# Patient Record
Sex: FEMALE | Race: WHITE
[De-identification: names, ages, dates, MRNs, and addresses within clinical notes are randomized per-mention and may not be internally consistent; named-entity substitution may affect disease eponyms.]

---

## 2018-04-04 ENCOUNTER — HOSPITAL ENCOUNTER (OUTPATIENT)
Dept: HOSPITAL 17 - CPRE | Age: 58
End: 2018-04-04
Attending: NEUROLOGICAL SURGERY
Payer: COMMERCIAL

## 2018-04-04 DIAGNOSIS — C71.9: Primary | ICD-10-CM

## 2018-04-10 ENCOUNTER — HOSPITAL ENCOUNTER (INPATIENT)
Dept: HOSPITAL 17 - HSDI | Age: 58
LOS: 3 days | Discharge: HOME HEALTH | DRG: 27 | End: 2018-04-13
Attending: NEUROLOGICAL SURGERY | Admitting: NEUROLOGICAL SURGERY
Payer: COMMERCIAL

## 2018-04-10 VITALS — HEART RATE: 70 BPM

## 2018-04-10 VITALS — WEIGHT: 153.88 LBS | HEIGHT: 68 IN | BODY MASS INDEX: 23.32 KG/M2

## 2018-04-10 VITALS
TEMPERATURE: 98.1 F | DIASTOLIC BLOOD PRESSURE: 63 MMHG | RESPIRATION RATE: 21 BRPM | HEART RATE: 52 BPM | OXYGEN SATURATION: 100 % | SYSTOLIC BLOOD PRESSURE: 133 MMHG

## 2018-04-10 VITALS
SYSTOLIC BLOOD PRESSURE: 119 MMHG | TEMPERATURE: 98 F | DIASTOLIC BLOOD PRESSURE: 57 MMHG | RESPIRATION RATE: 22 BRPM | OXYGEN SATURATION: 100 % | HEART RATE: 66 BPM

## 2018-04-10 DIAGNOSIS — C71.1: Primary | ICD-10-CM

## 2018-04-10 LAB
BASOPHILS # BLD AUTO: 0 TH/MM3 (ref 0–0.2)
BASOPHILS NFR BLD: 0.1 % (ref 0–2)
BUN SERPL-MCNC: 11 MG/DL (ref 7–18)
CALCIUM SERPL-MCNC: 8 MG/DL (ref 8.5–10.1)
CALCIUM TP COR SERPL-MCNC: 9 MG/DL (ref 8.5–10.1)
CHLORIDE SERPL-SCNC: 111 MEQ/L (ref 98–107)
CREAT SERPL-MCNC: 0.63 MG/DL (ref 0.5–1)
EOSINOPHIL # BLD: 0 TH/MM3 (ref 0–0.4)
EOSINOPHIL NFR BLD: 0.1 % (ref 0–4)
ERYTHROCYTE [DISTWIDTH] IN BLOOD BY AUTOMATED COUNT: 12.8 % (ref 11.6–17.2)
GFR SERPLBLD BASED ON 1.73 SQ M-ARVRAT: 97 ML/MIN (ref 89–?)
GLUCOSE SERPL-MCNC: 137 MG/DL (ref 74–106)
HCO3 BLD-SCNC: 25.8 MEQ/L (ref 21–32)
HCT VFR BLD CALC: 30.6 % (ref 35–46)
HGB BLD-MCNC: 10.5 GM/DL (ref 11.6–15.3)
LYMPHOCYTES # BLD AUTO: 0.6 TH/MM3 (ref 1–4.8)
LYMPHOCYTES NFR BLD AUTO: 5.9 % (ref 9–44)
MAGNESIUM SERPL-MCNC: 1.8 MG/DL (ref 1.5–2.5)
MCH RBC QN AUTO: 29.5 PG (ref 27–34)
MCHC RBC AUTO-ENTMCNC: 34.5 % (ref 32–36)
MCV RBC AUTO: 85.7 FL (ref 80–100)
MONOCYTE #: 0.2 TH/MM3 (ref 0–0.9)
MONOCYTES NFR BLD: 2.1 % (ref 0–8)
NEUTROPHILS # BLD AUTO: 8.7 TH/MM3 (ref 1.8–7.7)
NEUTROPHILS NFR BLD AUTO: 91.8 % (ref 16–70)
PLATELET # BLD: 241 TH/MM3 (ref 150–450)
PMV BLD AUTO: 8.4 FL (ref 7–11)
PROT SERPL-MCNC: 5.4 GM/DL (ref 6.4–8.2)
RBC # BLD AUTO: 3.57 MIL/MM3 (ref 4–5.3)
SODIUM SERPL-SCNC: 144 MEQ/L (ref 136–145)
WBC # BLD AUTO: 9.5 TH/MM3 (ref 4–11)

## 2018-04-10 PROCEDURE — 88333 PATH CONSLTJ SURG CYTO XM 1: CPT

## 2018-04-10 PROCEDURE — 80048 BASIC METABOLIC PNL TOTAL CA: CPT

## 2018-04-10 PROCEDURE — 86901 BLOOD TYPING SEROLOGIC RH(D): CPT

## 2018-04-10 PROCEDURE — 86850 RBC ANTIBODY SCREEN: CPT

## 2018-04-10 PROCEDURE — 86900 BLOOD TYPING SEROLOGIC ABO: CPT

## 2018-04-10 PROCEDURE — 85025 COMPLETE CBC W/AUTO DIFF WBC: CPT

## 2018-04-10 PROCEDURE — 84155 ASSAY OF PROTEIN SERUM: CPT

## 2018-04-10 PROCEDURE — C1713 ANCHOR/SCREW BN/BN,TIS/BN: HCPCS

## 2018-04-10 PROCEDURE — 70450 CT HEAD/BRAIN W/O DYE: CPT

## 2018-04-10 PROCEDURE — 94150 VITAL CAPACITY TEST: CPT

## 2018-04-10 PROCEDURE — 88307 TISSUE EXAM BY PATHOLOGIST: CPT

## 2018-04-10 PROCEDURE — 00B00ZX EXCISION OF BRAIN, OPEN APPROACH, DIAGNOSTIC: ICD-10-PCS | Performed by: NEUROLOGICAL SURGERY

## 2018-04-10 PROCEDURE — 83735 ASSAY OF MAGNESIUM: CPT

## 2018-04-10 RX ADMIN — DEXAMETHASONE SODIUM PHOSPHATE SCH MG: 4 INJECTION, SOLUTION INTRAMUSCULAR; INTRAVENOUS at 16:48

## 2018-04-10 RX ADMIN — STANDARDIZED SENNA CONCENTRATE AND DOCUSATE SODIUM SCH TAB: 8.6; 5 TABLET, FILM COATED ORAL at 22:25

## 2018-04-10 RX ADMIN — MORPHINE SULFATE PRN MG: 2 INJECTION, SOLUTION INTRAMUSCULAR; INTRAVENOUS at 22:37

## 2018-04-10 RX ADMIN — HYDROCODONE BITARTRATE AND ACETAMINOPHEN PRN TAB: 10; 325 TABLET ORAL at 22:32

## 2018-04-10 RX ADMIN — DEXAMETHASONE SODIUM PHOSPHATE SCH MG: 4 INJECTION, SOLUTION INTRAMUSCULAR; INTRAVENOUS at 22:51

## 2018-04-10 RX ADMIN — MORPHINE SULFATE PRN MG: 2 INJECTION, SOLUTION INTRAMUSCULAR; INTRAVENOUS at 18:27

## 2018-04-10 RX ADMIN — Medication SCH ML: at 21:00

## 2018-04-10 RX ADMIN — LEVETIRACETAM SCH MLS/HR: 100 INJECTION, SOLUTION, CONCENTRATE INTRAVENOUS at 23:39

## 2018-04-10 RX ADMIN — ONDANSETRON PRN MG: 2 INJECTION, SOLUTION INTRAMUSCULAR; INTRAVENOUS at 22:32

## 2018-04-10 RX ADMIN — SODIUM CHLORIDE AND POTASSIUM CHLORIDE SCH MLS/HR: 9; 1.49 INJECTION, SOLUTION INTRAVENOUS at 13:25

## 2018-04-10 NOTE — PD.OP
cc:   Leonel Tran MD


__________________________________________________





Operative Report


Date of Surgery:  Apr 10, 2018


Preoperative Diagnosis:  


Recurrent right frontal lobe mass


Postoperative Diagnosis:  


Glioma


Procedure:


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 

BrainLab stereotactic intraoperative navigation; microsurgical technique


Anesthesia:


Gen. endotracheal by Padma pratt


Surgeon:


Hawk White M.D.


Assistant(s):


Betty Marshall


Operation and Findings:


Following initiation of general endotracheal anesthesia the patient had 

invasive lines and Meza catheter in place along with sequential compression 

device.  A gram of vancomycin and Keppra was administered intravenously and he 

was positioned supine with the head secured in the Foss 3 pin headrest.  

The BrainLab navigation system was then registered with external landmarks and 

good accuracy confirmed.  The previous bicoronal incision site was then shaved 

and prepped with ChloraPrep and sterilely draped in the usual sterile fashion.  

Incision was then made after infiltrating the scalp was 0.5% Marcaine with 

epinephrine solution a skin incision made and Brian clips were used at the 

scalp edges for hemostasis and the flap retracted with hooks.  Right temporalis 

muscle and fascia was also incised and detached from the temporal bone and 

retracted with hooks.  The previous right sided craniotomy plates were removed. 

With an automated  right orbital zygomatic bur hole made and then 

with the craniotome the bone flap was elevated.  The dura opened in a cruciate 

format and bone holes placed in the craniotomy edges with 4-0 Nurolon dural 

tacking stitches for hemostasis.  There was some dural adhesions noted around 

the frontal lobe overlying this mass from the previous surgery which were 

dissected out. Further dissection was undertaken using microtechnique with 

microscope medication.  Using BrainLab navigation guidance a circumferential 

corticectomy identifying the anterior superior and posterior extent of this 

mass was undertaken with subcortical resection circumferentially.  More 

medially very necrotic and soft tissue was noted and resected until more normal 

subcortical white matter tissue was evident.  A gross total resection was 

achieved. Fresh frozen specimen sent was consistent with low grade glioma and 

rest of the specimens were sent for permanent pathology sections and some of 

the mass was suctioned out.  Bipolar cautery used for hemostasis in the 

resection bed which was then lined with Surgicel and no bleeding was 

encountered at this point.  Cavity was filled with saline solution and the dura 

approximately using 4 Nurolon sutures with a central dural tacking stitch.  

Bone flap approximated using [place]o mini plates and bur hole covers. The area 

was then copiously irrigated.  The temporalis fascia was approximated and using 

2-0 Vicryl sutures and the galea reapproximated using 3-0 Vicryl interrupted 

sutures and final scalp closure was with staples. The Coleman head was then 

removed and a sterile pressing dressing applied. There were no intraoperative 

complications and all sponge and needle count was correct at the end of the 

procedure.  Estimated blood loss about 100 cc. Patient was extubated and taken 

to the recovery room.











Hawk White MD Apr 10, 2018 12:19

## 2018-04-11 VITALS
TEMPERATURE: 98.8 F | HEART RATE: 65 BPM | SYSTOLIC BLOOD PRESSURE: 133 MMHG | RESPIRATION RATE: 10 BRPM | OXYGEN SATURATION: 100 % | DIASTOLIC BLOOD PRESSURE: 59 MMHG

## 2018-04-11 VITALS
SYSTOLIC BLOOD PRESSURE: 122 MMHG | HEART RATE: 70 BPM | RESPIRATION RATE: 12 BRPM | DIASTOLIC BLOOD PRESSURE: 58 MMHG | OXYGEN SATURATION: 100 % | TEMPERATURE: 98.6 F

## 2018-04-11 VITALS — HEART RATE: 72 BPM

## 2018-04-11 VITALS
RESPIRATION RATE: 17 BRPM | SYSTOLIC BLOOD PRESSURE: 129 MMHG | OXYGEN SATURATION: 100 % | DIASTOLIC BLOOD PRESSURE: 61 MMHG | HEART RATE: 75 BPM | TEMPERATURE: 98.9 F

## 2018-04-11 VITALS
DIASTOLIC BLOOD PRESSURE: 58 MMHG | OXYGEN SATURATION: 100 % | HEART RATE: 62 BPM | SYSTOLIC BLOOD PRESSURE: 113 MMHG | TEMPERATURE: 99.3 F | RESPIRATION RATE: 11 BRPM

## 2018-04-11 VITALS
SYSTOLIC BLOOD PRESSURE: 133 MMHG | DIASTOLIC BLOOD PRESSURE: 63 MMHG | TEMPERATURE: 98.9 F | OXYGEN SATURATION: 99 % | RESPIRATION RATE: 10 BRPM | HEART RATE: 66 BPM

## 2018-04-11 VITALS
RESPIRATION RATE: 15 BRPM | HEART RATE: 70 BPM | DIASTOLIC BLOOD PRESSURE: 62 MMHG | TEMPERATURE: 99.2 F | SYSTOLIC BLOOD PRESSURE: 130 MMHG | OXYGEN SATURATION: 100 %

## 2018-04-11 VITALS — HEART RATE: 63 BPM

## 2018-04-11 VITALS — HEART RATE: 66 BPM

## 2018-04-11 VITALS — HEART RATE: 71 BPM

## 2018-04-11 VITALS — HEART RATE: 87 BPM

## 2018-04-11 LAB
BUN SERPL-MCNC: 12 MG/DL (ref 7–18)
CALCIUM SERPL-MCNC: 8.6 MG/DL (ref 8.5–10.1)
CHLORIDE SERPL-SCNC: 109 MEQ/L (ref 98–107)
CREAT SERPL-MCNC: 0.72 MG/DL (ref 0.5–1)
GFR SERPLBLD BASED ON 1.73 SQ M-ARVRAT: 83 ML/MIN (ref 89–?)
GLUCOSE SERPL-MCNC: 136 MG/DL (ref 74–106)
HCO3 BLD-SCNC: 24.4 MEQ/L (ref 21–32)
MAGNESIUM SERPL-MCNC: 2.4 MG/DL (ref 1.5–2.5)
SODIUM SERPL-SCNC: 142 MEQ/L (ref 136–145)

## 2018-04-11 RX ADMIN — ONDANSETRON PRN MG: 2 INJECTION, SOLUTION INTRAMUSCULAR; INTRAVENOUS at 06:03

## 2018-04-11 RX ADMIN — STANDARDIZED SENNA CONCENTRATE AND DOCUSATE SODIUM SCH TAB: 8.6; 5 TABLET, FILM COATED ORAL at 20:43

## 2018-04-11 RX ADMIN — HYDROCODONE BITARTRATE AND ACETAMINOPHEN PRN TAB: 10; 325 TABLET ORAL at 19:47

## 2018-04-11 RX ADMIN — Medication SCH ML: at 08:17

## 2018-04-11 RX ADMIN — HYDROCODONE BITARTRATE AND ACETAMINOPHEN PRN TAB: 10; 325 TABLET ORAL at 08:22

## 2018-04-11 RX ADMIN — STANDARDIZED SENNA CONCENTRATE AND DOCUSATE SODIUM SCH TAB: 8.6; 5 TABLET, FILM COATED ORAL at 08:17

## 2018-04-11 RX ADMIN — SODIUM CHLORIDE AND POTASSIUM CHLORIDE SCH MLS/HR: 9; 1.49 INJECTION, SOLUTION INTRAVENOUS at 13:14

## 2018-04-11 RX ADMIN — DEXAMETHASONE SODIUM PHOSPHATE SCH MG: 4 INJECTION, SOLUTION INTRAMUSCULAR; INTRAVENOUS at 23:33

## 2018-04-11 RX ADMIN — SODIUM CHLORIDE AND POTASSIUM CHLORIDE SCH MLS/HR: 9; 1.49 INJECTION, SOLUTION INTRAVENOUS at 02:30

## 2018-04-11 RX ADMIN — DEXAMETHASONE SODIUM PHOSPHATE SCH MG: 4 INJECTION, SOLUTION INTRAMUSCULAR; INTRAVENOUS at 06:03

## 2018-04-11 RX ADMIN — ACYCLOVIR SCH TAB: 800 TABLET ORAL at 08:17

## 2018-04-11 RX ADMIN — DEXAMETHASONE SODIUM PHOSPHATE SCH MG: 4 INJECTION, SOLUTION INTRAMUSCULAR; INTRAVENOUS at 17:56

## 2018-04-11 RX ADMIN — PANTOPRAZOLE SCH MG: 40 TABLET, DELAYED RELEASE ORAL at 08:17

## 2018-04-11 RX ADMIN — DEXAMETHASONE SODIUM PHOSPHATE SCH MG: 4 INJECTION, SOLUTION INTRAMUSCULAR; INTRAVENOUS at 13:13

## 2018-04-11 RX ADMIN — HYDROCODONE BITARTRATE AND ACETAMINOPHEN PRN TAB: 10; 325 TABLET ORAL at 13:40

## 2018-04-11 RX ADMIN — HYDROCODONE BITARTRATE AND ACETAMINOPHEN PRN TAB: 10; 325 TABLET ORAL at 01:59

## 2018-04-11 RX ADMIN — LEVETIRACETAM SCH MLS/HR: 100 INJECTION, SOLUTION, CONCENTRATE INTRAVENOUS at 13:13

## 2018-04-11 RX ADMIN — Medication SCH ML: at 20:43

## 2018-04-11 RX ADMIN — MORPHINE SULFATE PRN MG: 2 INJECTION, SOLUTION INTRAMUSCULAR; INTRAVENOUS at 02:05

## 2018-04-11 RX ADMIN — LEVETIRACETAM SCH MLS/HR: 100 INJECTION, SOLUTION, CONCENTRATE INTRAVENOUS at 23:33

## 2018-04-11 NOTE — HHI.NSPN
__________________________________________________


 (Aubrey Harvey)





History


Chief Complaint:  right sided headache s/p right craniotomy for neoplasm 

resection.


 (Aubrey Harvey)


Interval History


4/11/18:  Pt awake and alert sitting up in chair.  Complains of headache right 

side coming up from neck and radiating into forehead.  She had some periods of 

nausea.  Pt ambulated with PT today.


 (Aubrey Harvey)





Review of Systems


General:  Negative for: fever, chills, insomnia


Respiratory:  Negative for: shortness of breath, cough, sputum


Cardiovascular:  Negative for: chest pain


Gastrointestinal:  Negative for: nausea, vomitting, diarrhea, constipation (

Aubrey Harvey)





Exam


Results





Vital Signs








  Date Time  Temp Pulse Resp B/P (MAP) Pulse Ox O2 Delivery O2 Flow Rate FiO2


 


4/11/18 06:00  72      


 


4/11/18 04:00 98.9  17 129/61 (83) 100   


 


4/10/18 19:00      Room Air  


 


4/10/18 12:38       3 














Intake and Output   


 


 4/11/18 4/11/18 4/12/18





 08:00 16:00 00:00


 


Intake Total 1160 ml  


 


Output Total 700 ml  


 


Balance 460 ml  








 (Aubrey Harvey)


Physical Examination


General:  Pt awake and alert.  Sitting up in chair.  


Eyes:  Pupils equal.  Sclera anicteric.


Resp:  CTA bilaterally.


Heart:  NSR no murmurs


Abd:  Soft positive bs


Skin:  Head bandaged clean and dry.


Muscle:  Moves all 4 extremities symmetrically.


Neuro:  Pt awake and alert.  Follows commands.  Pupils 3mm bilaterally.  

Sensation intact in face and extremities.


 (Aubrey Harvey)


Lab, Micro, Other Results





Last Impressions








Head CT 4/11/18 0600 Signed





Impressions: 





 Service Date/Time:  Wednesday, April 11, 2018 05:49 - CONCLUSION:  1. Right 





 frontal craniotomy with minimal encephalomalacia.  2. Pneumocephaly.     Aubrey Clancy MD 








Laboratory Tests








Test


  4/10/18


13:02 4/11/18


04:58


 


White Blood Count 9.5 TH/MM3  


 


Red Blood Count 3.57 MIL/MM3  


 


Hemoglobin 10.5 GM/DL  


 


Hematocrit 30.6 %  


 


Mean Corpuscular Volume 85.7 FL  


 


Mean Corpuscular Hemoglobin 29.5 PG  


 


Mean Corpuscular Hemoglobin


Concent 34.5 % 


  


 


 


Red Cell Distribution Width 12.8 %  


 


Platelet Count 241 TH/MM3  


 


Mean Platelet Volume 8.4 FL  


 


Neutrophils (%) (Auto) 91.8 %  


 


Lymphocytes (%) (Auto) 5.9 %  


 


Monocytes (%) (Auto) 2.1 %  


 


Eosinophils (%) (Auto) 0.1 %  


 


Basophils (%) (Auto) 0.1 %  


 


Neutrophils # (Auto) 8.7 TH/MM3  


 


Lymphocytes # (Auto) 0.6 TH/MM3  


 


Monocytes # (Auto) 0.2 TH/MM3  


 


Eosinophils # (Auto) 0.0 TH/MM3  


 


Basophils # (Auto) 0.0 TH/MM3  


 


CBC Comment DIFF FINAL  


 


Differential Comment   


 


Blood Urea Nitrogen 11 MG/DL  12 MG/DL 


 


Creatinine 0.63 MG/DL  0.72 MG/DL 


 


Random Glucose 137 MG/DL  136 MG/DL 


 


Total Protein 5.4 GM/DL  


 


Calcium Level 8.0 MG/DL  8.6 MG/DL 


 


Magnesium Level 1.8 MG/DL  2.4 MG/DL 


 


Sodium Level 144 MEQ/L  142 MEQ/L 


 


Potassium Level 3.9 MEQ/L  4.2 MEQ/L 


 


Chloride Level 111 MEQ/L  109 MEQ/L 


 


Carbon Dioxide Level 25.8 MEQ/L  24.4 MEQ/L 


 


Anion Gap 7 MEQ/L  9 MEQ/L 


 


Estimat Glomerular Filtration


Rate 97 ML/MIN 


  83 ML/MIN 


 


 


Protein Corrected Calcium 9.0 MG/DL  








 (Aubrey Harvey)





Medical Decision Making


Impression and Plan


A:  56 y/o FM s/p right frontal craniotomy with resection of frontal lobe 

neoplasm.





P:  Continue to monitor neuro exam


     Continue with PT


     D/C Susana.


 (Aubrey Harvey)





Attending Statement


The exam, history, and the medical decision-making described in the above note 

were completed with the assistance of the mid-level provider. I reviewed and 

agree with the findings presented.  I attest that I had a face-to-face 

encounter with the patient on the same day, and personally performed and 

documented my assessment and findings in the medical record.  Stable neurologic 

exam and postop CT scan of the head.  Increase activity status as tolerated.  

Updated  at bedside.


 (Hawk White MD)











Aubrey Harvey Apr 11, 2018 09:10


Hawk White MD Apr 11, 2018 10:55

## 2018-04-11 NOTE — RADRPT
EXAM DATE/TIME:  04/11/2018 05:49 

 

[facility] COMPARISON:     

CT BRAIN W/O CONTRAST, February 16, 2014, 20:06.

 

 

INDICATIONS :     

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

                      

 

RADIATION DOSE:     

56.35 CTDIvol (mGy) 

 

 

 

MEDICAL HISTORY :     

Hypertension.  Brain tumor. 

 

SURGICAL HISTORY :      

None. 

 

ENCOUNTER:      

Initial

 

ACUITY:      

1 day

 

PAIN SCALE:      

0/10

 

LOCATION:        

cranial 

 

TECHNIQUE:     

Multiple contiguous axial images were obtained of the head.  Using automated exposure control and adj
ustment of the mA and/or kV according to patient size, radiation dose was kept as low as reasonably a
chievable to obtain optimal diagnostic quality images.   DICOM format image data is available electro
nically for review and comparison.  

 

FINDINGS:     

 

CEREBRUM:     

Right frontal craniotomy with minimal encephalomalacia. Pneumocephaly. The ventricles are normal for 
age.  No evidence of midline shift, mass lesion, hemorrhage or acute infarction.  No extra-axial flui
d collections are seen.

 

POSTERIOR FOSSA:     

The cerebellum and brainstem are intact.  The 4th ventricle is midline.  The cerebellopontine angle i
s unremarkable.

 

EXTRACRANIAL:     

The visualized portion of the orbits is intact.

 

SKULL:     

The calvaria is intact.  No evidence of skull fracture.

 

CONCLUSION:     

1. Right frontal craniotomy with minimal encephalomalacia. 

2. Pneumocephaly. 

 

 

 Aubrey Clancy MD on April 11, 2018 at 6:30           

Board Certified Radiologist.

 This report was verified electronically.

## 2018-04-12 VITALS
DIASTOLIC BLOOD PRESSURE: 58 MMHG | TEMPERATURE: 99.1 F | SYSTOLIC BLOOD PRESSURE: 122 MMHG | RESPIRATION RATE: 16 BRPM | HEART RATE: 70 BPM | OXYGEN SATURATION: 100 %

## 2018-04-12 VITALS
DIASTOLIC BLOOD PRESSURE: 68 MMHG | SYSTOLIC BLOOD PRESSURE: 152 MMHG | OXYGEN SATURATION: 99 % | TEMPERATURE: 98.5 F | RESPIRATION RATE: 18 BRPM | HEART RATE: 52 BPM

## 2018-04-12 VITALS — HEART RATE: 56 BPM

## 2018-04-12 VITALS
RESPIRATION RATE: 16 BRPM | HEART RATE: 72 BPM | TEMPERATURE: 99 F | OXYGEN SATURATION: 100 % | DIASTOLIC BLOOD PRESSURE: 66 MMHG | SYSTOLIC BLOOD PRESSURE: 149 MMHG

## 2018-04-12 VITALS
DIASTOLIC BLOOD PRESSURE: 68 MMHG | TEMPERATURE: 98.6 F | OXYGEN SATURATION: 99 % | SYSTOLIC BLOOD PRESSURE: 160 MMHG | HEART RATE: 56 BPM | RESPIRATION RATE: 18 BRPM

## 2018-04-12 VITALS
RESPIRATION RATE: 10 BRPM | DIASTOLIC BLOOD PRESSURE: 55 MMHG | OXYGEN SATURATION: 97 % | TEMPERATURE: 98.8 F | HEART RATE: 62 BPM | SYSTOLIC BLOOD PRESSURE: 106 MMHG

## 2018-04-12 VITALS
DIASTOLIC BLOOD PRESSURE: 58 MMHG | OXYGEN SATURATION: 99 % | RESPIRATION RATE: 12 BRPM | TEMPERATURE: 98.3 F | SYSTOLIC BLOOD PRESSURE: 122 MMHG | HEART RATE: 51 BPM

## 2018-04-12 VITALS — HEART RATE: 58 BPM

## 2018-04-12 RX ADMIN — DEXAMETHASONE SCH MG: 4 TABLET ORAL at 17:24

## 2018-04-12 RX ADMIN — ACYCLOVIR SCH TAB: 800 TABLET ORAL at 09:07

## 2018-04-12 RX ADMIN — Medication SCH ML: at 22:56

## 2018-04-12 RX ADMIN — DEXAMETHASONE SODIUM PHOSPHATE SCH MG: 4 INJECTION, SOLUTION INTRAMUSCULAR; INTRAVENOUS at 04:59

## 2018-04-12 RX ADMIN — STANDARDIZED SENNA CONCENTRATE AND DOCUSATE SODIUM SCH TAB: 8.6; 5 TABLET, FILM COATED ORAL at 09:07

## 2018-04-12 RX ADMIN — DEXAMETHASONE SCH MG: 4 TABLET ORAL at 13:28

## 2018-04-12 RX ADMIN — STANDARDIZED SENNA CONCENTRATE AND DOCUSATE SODIUM SCH TAB: 8.6; 5 TABLET, FILM COATED ORAL at 20:51

## 2018-04-12 RX ADMIN — HYDROCODONE BITARTRATE AND ACETAMINOPHEN PRN TAB: 10; 325 TABLET ORAL at 13:28

## 2018-04-12 RX ADMIN — DEXAMETHASONE SCH MG: 4 TABLET ORAL at 09:07

## 2018-04-12 RX ADMIN — SODIUM CHLORIDE AND POTASSIUM CHLORIDE SCH MLS/HR: 9; 1.49 INJECTION, SOLUTION INTRAVENOUS at 04:59

## 2018-04-12 RX ADMIN — PANTOPRAZOLE SCH MG: 40 TABLET, DELAYED RELEASE ORAL at 09:07

## 2018-04-12 RX ADMIN — Medication SCH ML: at 09:07

## 2018-04-12 RX ADMIN — HYDROCODONE BITARTRATE AND ACETAMINOPHEN PRN TAB: 10; 325 TABLET ORAL at 05:05

## 2018-04-12 RX ADMIN — HYDROCODONE BITARTRATE AND ACETAMINOPHEN PRN TAB: 10; 325 TABLET ORAL at 20:52

## 2018-04-12 NOTE — HHI.NSPN
__________________________________________________


 (Aubrey Harvey)





History


Chief Complaint:  right sided headache s/p right craniotomy for neoplasm 

resection.





 (Aubrey Havrey)


Interval History


4/11/18:  Pt awake and alert sitting up in chair.  Complains of headache right 

side coming up from neck and radiating into forehead.  She had some periods of 

nausea.  Pt ambulated with PT today.


4/12/18:  Pt awake and alert.  Sitting up in chair.  Headaches controlled with 

pain medication.  Pt feeling better.


 (Aubrey Harvey)





Review of Systems


General:  Negative for: fever, chills, insomnia


Respiratory:  Negative for: shortness of breath, cough, sputum


Cardiovascular:  Negative for: chest pain


Gastrointestinal:  Negative for: nausea, vomitting, diarrhea, constipation (

Aubrey Harvey)





Exam


Results





Vital Signs








  Date Time  Temp Pulse Resp B/P (MAP) Pulse Ox O2 Delivery O2 Flow Rate FiO2


 


4/12/18 06:00  58      


 


4/12/18 04:00 99.1  16 122/58 (79) 100   


 


4/11/18 19:00      Room Air  


 


4/10/18 12:38       3 








 (Aubrey Harvey)


Physical Examination


General:  Pt awake and alert.  Sitting up in bed. 


Eyes:  Pupils equal.  Sclera anicteric.


Resp:  CTA bilaterally.


Heart:  NSR no murmurs


Abd:  Soft positive bs


Skin:  Head bandaged clean and dry.  Bandage removed and is clean and dry.  

Light bandage wrap placed to keep clean.  More facial edema today on right.


Muscle:  Moves all 4 extremities symmetrically.


Neuro:  Pt awake and alert.  Follows commands.  Pupils 3mm bilaterally.  

Sensation intact in face and extremities.


 (Aubrey Harvey)


Lab, Micro, Other Results





Last Impressions








Head CT 4/11/18 0600 Signed





Impressions: 





 Service Date/Time:  Wednesday, April 11, 2018 05:49 - CONCLUSION:  1. Right 





 frontal craniotomy with minimal encephalomalacia.  2. Pneumocephaly.     Aubrey Clancy MD 








 (Aubrey Harvey)





Medical Decision Making


Impression and Plan


A:  56 y/o FM s/p right frontal craniotomy with resection of frontal lobe 

neoplasm.





P:  Continue to monitor neuro exam


     Continue with PT


     Transfer to N


 (Aubrey Harvey)





Attending Statement


The exam, history, and the medical decision-making described in the above note 

were completed with the assistance of the mid-level provider. I reviewed and 

agree with the findings presented.  I attest that I had a face-to-face 

encounter with the patient on the same day, and personally performed and 

documented my assessment and findings in the medical record.  Doing very well 

postoperatively.  Mild incisional pain.  Tolerating diet and ambulating 

independently.  Pathology pending.  Anticipate discharge tomorrow if stable.


 (Hawk White MD)











Aubrey Harvey Apr 12, 2018 09:26


Hawk White MD Apr 12, 2018 18:59

## 2018-04-13 VITALS
HEART RATE: 54 BPM | OXYGEN SATURATION: 100 % | SYSTOLIC BLOOD PRESSURE: 165 MMHG | RESPIRATION RATE: 18 BRPM | TEMPERATURE: 97.3 F | DIASTOLIC BLOOD PRESSURE: 70 MMHG

## 2018-04-13 VITALS
RESPIRATION RATE: 18 BRPM | HEART RATE: 59 BPM | DIASTOLIC BLOOD PRESSURE: 70 MMHG | SYSTOLIC BLOOD PRESSURE: 161 MMHG | TEMPERATURE: 97.7 F | OXYGEN SATURATION: 100 %

## 2018-04-13 VITALS
OXYGEN SATURATION: 95 % | DIASTOLIC BLOOD PRESSURE: 62 MMHG | HEART RATE: 77 BPM | TEMPERATURE: 98.3 F | SYSTOLIC BLOOD PRESSURE: 136 MMHG | RESPIRATION RATE: 18 BRPM

## 2018-04-13 VITALS
SYSTOLIC BLOOD PRESSURE: 141 MMHG | TEMPERATURE: 97.4 F | DIASTOLIC BLOOD PRESSURE: 52 MMHG | RESPIRATION RATE: 18 BRPM | HEART RATE: 62 BPM | OXYGEN SATURATION: 98 %

## 2018-04-13 RX ADMIN — PANTOPRAZOLE SCH MG: 40 TABLET, DELAYED RELEASE ORAL at 07:57

## 2018-04-13 RX ADMIN — HYDROCODONE BITARTRATE AND ACETAMINOPHEN PRN TAB: 10; 325 TABLET ORAL at 07:58

## 2018-04-13 RX ADMIN — ACYCLOVIR SCH TAB: 800 TABLET ORAL at 07:56

## 2018-04-13 RX ADMIN — STANDARDIZED SENNA CONCENTRATE AND DOCUSATE SODIUM SCH TAB: 8.6; 5 TABLET, FILM COATED ORAL at 07:58

## 2018-04-13 RX ADMIN — Medication SCH ML: at 07:59

## 2018-04-13 RX ADMIN — DEXAMETHASONE SCH MG: 4 TABLET ORAL at 07:58

## 2018-04-13 NOTE — HHI.FF
Face to Face Verification


Diagnosis:  


(1) Brain mass


Home Health Nursing








Order: Wound care and dressing changes





 Nursing assessment with vital signs








Instructions:


Remove scalp staples on 4/20/18.











I have seen patient Nydia Escalera on 4/13/18. My clinical findings support 

the need for the requested home health care services because:








 Deconditioned w/ increased weakness














I certify that my clinical findings support that this patient is homebound 

because:








 Post-op weakness





 Unable to use public transportation

















Aubrey Harvey Apr 13, 2018 9:49 am